# Patient Record
Sex: FEMALE
[De-identification: names, ages, dates, MRNs, and addresses within clinical notes are randomized per-mention and may not be internally consistent; named-entity substitution may affect disease eponyms.]

---

## 2021-03-08 ENCOUNTER — TRANSCRIPTION ENCOUNTER (OUTPATIENT)
Age: 56
End: 2021-03-08

## 2021-06-22 ENCOUNTER — TRANSCRIPTION ENCOUNTER (OUTPATIENT)
Age: 56
End: 2021-06-22

## 2023-01-25 ENCOUNTER — APPOINTMENT (OUTPATIENT)
Dept: PAIN MANAGEMENT | Facility: CLINIC | Age: 58
End: 2023-01-25
Payer: COMMERCIAL

## 2023-01-25 VITALS — WEIGHT: 165 LBS | BODY MASS INDEX: 27.49 KG/M2 | HEIGHT: 65 IN

## 2023-01-25 DIAGNOSIS — M54.16 RADICULOPATHY, LUMBAR REGION: ICD-10-CM

## 2023-01-25 PROCEDURE — 99204 OFFICE O/P NEW MOD 45 MIN: CPT

## 2023-01-25 NOTE — HISTORY OF PRESENT ILLNESS
[Left Leg] : left leg [6] : 6 [Burning] : burning [Dull/Aching] : dull/aching [Shooting] : shooting [Constant] : constant [Household chores] : household chores [Sleep] : sleep [Rest] : rest [Meds] : meds [Ice] : ice [Heat] : heat [Full time] : Work status: full time [] : Post Surgical Visit: no [FreeTextEntry1] : TO THE HEAL  [FreeTextEntry7] : HEAL  [de-identified] : TURNING

## 2023-01-25 NOTE — ASSESSMENT
[FreeTextEntry1] : Subjective findings\par This 57-year-old female presents with pain in her back with a radiating component down her left leg to the heel.  Patient has had this pain in the past proximately 4 to 5 years ago and was treated with epidural steroid injection successfully.  Patient has had recurrence of the pain.  Patient denies any bowel or bladder incontinence or any progressive weakness.  She has failed medical management and physical therapy for the treatment of this episode of the pain presents to us for ongoing care.  The CV/Pulm/GI/Heme/Renal/Hepatic//Psych/Endo systems are reviewed. A full ROS was performed and reviewed with the patient today, see intake form.  Average pain score for the month is 6 out of ten. The patient's current medications are documented to the best of their ability. The patient has failed conservative therapies to include medical management, and physical activity to treat the pain. The patient has decreased function secondary to the pain.\par Objective findings\par I there are no recent imaging studies of the lumbar spine.  Patient is able to get to a standing position with minimal difficulty.  While standing patient ambulates without an assistive device.  Motor and sensory exams appear grossly intact.\par Assessment\par Lumbar radiculopathy.\par Plan\par Prior to any injection the patient will obtain MRI of the lumbar spine strenuously exact etiology of the pain.  Spoke to patient about epidural steroid injections. Explained risks, benefits and alternatives including but not limited to the risk of infection, bleeding, headache, syncopal episode, failure to resolve issues, allergic reaction, symptom recurrence, allergic reaction, nerve injury, and increased pain. The patient understands the risks. All questions were answered. The patient is willing to proceed. The importance of increasing exercise after the injection is also stressed. The use of the injection as a jump start so that the patient can do more exercise, but that the exercise is the long term answer for the patient is reviewed. The patient states understanding.\par